# Patient Record
(demographics unavailable — no encounter records)

---

## 2025-03-04 NOTE — DISCUSSION/SUMMARY
[FreeTextEntry1] : KELLI is a 57 year female who presents for On exam, negative CST, normal PVR, no POP.   [] []   f/u All questions answered.

## 2025-03-04 NOTE — HISTORY OF PRESENT ILLNESS
[Vaginal Wall Prolapse] : no [Rectal Prolapse] : no [Unable To Restrain Bowel Movement] : no [Urinary Frequency] : no [Feelings Of Urinary Urgency] : mild [x2] : nocturia two times a night [Urinary Tract Infection] : mild [Constipation Obstructed Defecation] : mild [Pelvic Pain] : no [Vaginal Pain] : mild [] : years ago [FreeTextEntry1] : KELLI is a 57 year female who presents for   Seen by Dr. Roldan on 02/28/2022 for OAB, severe atrophic vaginitis, myofascial abdominal wall pain/pelvic floor myalgia. She was referred to [facility] and recommended BT.   US Renal 04/27/2022 - No hydronephrosis. Unilocular benign-appearing cyst in the left kidney.    Daytime frequency: Yes Nocturia: 1-2x Urinary urgency: Yes  Leakage of urine with urgency: No Leakage of urine with coughing sneezing laughing: No Incontinence pad use:  Sensation of incomplete bladder emptying: Yes History of frequent urinary tract infections:  History of hematuria: No Previous treatment: None Vaginal symptoms: Pelvic pain, Difficulty with sexual activity Bowel symptoms: Constipation      OB: 2 C/S GYN: LMP 07/04/2020, Negative pap 06/22/2018 - negative HPV, Hx HPV 1985 - cryosurgery done, No estrogen use, Hc fibroids, Hx ovarian cysts PMH: Breast cancer, Depression, HTN, HLD PSH: C/S x2, Cryosurgery, B/l mastectomy Allx: PCN, Bactrim

## 2025-03-04 NOTE — ADDENDUM
[FreeTextEntry1] : This note was written by Dasha Phan, acting as the  for Dr. De Leon. This note accurately reflects the work and decisions made by Dr. De Leon.

## 2025-03-04 NOTE — OB HISTORY
[ ___] : [unfilled]  section delivery(s) [Definite ___ (Date)] : the last menstrual period was [unfilled] [Last Pap Smear ___] : date of last pap smear was on [unfilled] [Sexually Active] : sexually active [Abnormal Pap Smear] : normal pap smear [Taking Estrogens] : is not taking estrogen replacement [FreeTextEntry1] : Largest baby 8lbs 10oz